# Patient Record
Sex: MALE
[De-identification: names, ages, dates, MRNs, and addresses within clinical notes are randomized per-mention and may not be internally consistent; named-entity substitution may affect disease eponyms.]

---

## 2018-06-08 PROBLEM — Z00.00 ENCOUNTER FOR PREVENTIVE HEALTH EXAMINATION: Status: ACTIVE | Noted: 2018-06-08

## 2018-06-12 ENCOUNTER — APPOINTMENT (OUTPATIENT)
Dept: UROLOGY | Facility: CLINIC | Age: 58
End: 2018-06-12
Payer: COMMERCIAL

## 2018-06-12 VITALS
BODY MASS INDEX: 28.44 KG/M2 | HEIGHT: 72 IN | HEART RATE: 74 BPM | SYSTOLIC BLOOD PRESSURE: 132 MMHG | DIASTOLIC BLOOD PRESSURE: 88 MMHG | WEIGHT: 210 LBS | OXYGEN SATURATION: 94 % | RESPIRATION RATE: 16 BRPM

## 2018-06-12 DIAGNOSIS — Z80.3 FAMILY HISTORY OF MALIGNANT NEOPLASM OF BREAST: ICD-10-CM

## 2018-06-12 DIAGNOSIS — Z80.42 FAMILY HISTORY OF MALIGNANT NEOPLASM OF PROSTATE: ICD-10-CM

## 2018-06-12 PROCEDURE — 99214 OFFICE O/P EST MOD 30 MIN: CPT

## 2018-09-04 ENCOUNTER — APPOINTMENT (OUTPATIENT)
Dept: UROLOGY | Facility: CLINIC | Age: 58
End: 2018-09-04

## 2019-02-15 ENCOUNTER — APPOINTMENT (OUTPATIENT)
Dept: UROLOGY | Facility: CLINIC | Age: 59
End: 2019-02-15
Payer: COMMERCIAL

## 2019-02-15 VITALS
SYSTOLIC BLOOD PRESSURE: 136 MMHG | OXYGEN SATURATION: 97 % | HEART RATE: 71 BPM | RESPIRATION RATE: 17 BRPM | DIASTOLIC BLOOD PRESSURE: 72 MMHG

## 2019-02-15 PROCEDURE — 99214 OFFICE O/P EST MOD 30 MIN: CPT

## 2019-02-15 RX ORDER — CIPROFLOXACIN HYDROCHLORIDE 500 MG/1
500 TABLET, FILM COATED ORAL
Qty: 2 | Refills: 0 | Status: DISCONTINUED | COMMUNITY
Start: 2018-06-12 | End: 2019-02-15

## 2019-02-15 RX ORDER — CIPROFLOXACIN HYDROCHLORIDE 500 MG/1
500 TABLET, FILM COATED ORAL
Qty: 2 | Refills: 0 | Status: ACTIVE | COMMUNITY
Start: 2019-02-15 | End: 1900-01-01

## 2019-02-15 RX ORDER — ENEMA 19; 7 G/133ML; G/133ML
7-19 ENEMA RECTAL
Qty: 1 | Refills: 0 | Status: ACTIVE | COMMUNITY
Start: 2019-02-15 | End: 1900-01-01

## 2019-02-15 NOTE — HISTORY OF PRESENT ILLNESS
[FreeTextEntry1] : Very pleasant 58-year-old gentleman with elevated PSA to 14, last done 2018. Patient had previously seen Dr. Clay and was recommended to undergo a prostate biopsy. He did not show up for these appointments, and did not followup afterwards. PSA trend brook from 2 in . Prior to most recent PSA, PSA was 7.\par \par He denies dysuria. No hematuria. No flank pain or suprapubic pain. He reports a moderate urinary stream.\par \par History of prostate cancer into his brothers, one of whom  of his disease. [None] : no symptoms

## 2019-02-15 NOTE — ASSESSMENT
[FreeTextEntry1] : Very pleasant 58 year-old gentleman who presents for followup of elevated PSA\par -I discussed the need to perform a transrectal ultrasound-guided prostate biopsy with the patient.  I reviewed the potential etiologies of an elevated PSA with the patient, including but not limited to prostate cancer, benign prostatic hyperplasia (BPH), inflammation of the prostate, urinary tract infection (UTI), older age, and sexual intercourse. I discussed the risks of a prostate biopsy with the patient, including but not limited to pain, rectal bleeding, blood in the urine and semen, difficulty or inability to urinate, and infection. We discussed the procedure itself, including the need to place a transrectal ultrasound probe in the rectum and take systematic biopsies of the prostate gland after providing a local anesthetic agent.  I explained the sundar-procedural preparations that the patient will need to take, including self-administration of an enema the day of the biopsy and taking an oral antibiotic the evening before and the day of the biopsy.  An information handout was given to the patient. He wishes to proceed and we will schedule the biopsy for the near future.\par -Urine culture\par -Repeat PSA today\par -Discussed the potential severity of an elevated PSA to 14 as well as a family history of prostate cancer. Patient understands and wishes to proceed

## 2019-02-15 NOTE — PHYSICAL EXAM
[General Appearance - Well Developed] : well developed [General Appearance - Well Nourished] : well nourished [Normal Appearance] : normal appearance [Well Groomed] : well groomed [General Appearance - In No Acute Distress] : no acute distress [Abdomen Soft] : soft [Abdomen Tenderness] : non-tender [Costovertebral Angle Tenderness] : no ~M costovertebral angle tenderness [Urethral Meatus] : meatus normal [Urinary Bladder Findings] : the bladder was normal on palpation [Scrotum] : the scrotum was normal [Testes Mass (___cm)] : there were no testicular masses [FreeTextEntry1] : left firmness on CHARLETTE [Edema] : no peripheral edema [] : no respiratory distress [Respiration, Rhythm And Depth] : normal respiratory rhythm and effort [Exaggerated Use Of Accessory Muscles For Inspiration] : no accessory muscle use [Oriented To Time, Place, And Person] : oriented to person, place, and time [Affect] : the affect was normal [Mood] : the mood was normal [Not Anxious] : not anxious [Normal Station and Gait] : the gait and station were normal for the patient's age [No Focal Deficits] : no focal deficits [No Palpable Adenopathy] : no palpable adenopathy

## 2019-02-19 LAB
BACTERIA UR CULT: NORMAL
PSA FREE FLD-MCNC: 10 %
PSA FREE SERPL-MCNC: 1.21 NG/ML
PSA SERPL-MCNC: 11.88 NG/ML

## 2019-03-04 ENCOUNTER — MESSAGE (OUTPATIENT)
Age: 59
End: 2019-03-04

## 2019-03-05 ENCOUNTER — APPOINTMENT (OUTPATIENT)
Dept: UROLOGY | Facility: CLINIC | Age: 59
End: 2019-03-05
Payer: COMMERCIAL

## 2019-03-05 VITALS
SYSTOLIC BLOOD PRESSURE: 138 MMHG | BODY MASS INDEX: 28.44 KG/M2 | HEART RATE: 76 BPM | HEIGHT: 72 IN | WEIGHT: 210 LBS | DIASTOLIC BLOOD PRESSURE: 72 MMHG

## 2019-03-05 PROCEDURE — 76942 ECHO GUIDE FOR BIOPSY: CPT | Mod: 59

## 2019-03-05 PROCEDURE — 76872 US TRANSRECTAL: CPT

## 2019-03-05 PROCEDURE — 55700: CPT

## 2019-03-20 ENCOUNTER — APPOINTMENT (OUTPATIENT)
Dept: UROLOGY | Facility: CLINIC | Age: 59
End: 2019-03-20
Payer: COMMERCIAL

## 2019-03-20 VITALS
SYSTOLIC BLOOD PRESSURE: 122 MMHG | WEIGHT: 210 LBS | HEIGHT: 72 IN | HEART RATE: 86 BPM | BODY MASS INDEX: 28.44 KG/M2 | DIASTOLIC BLOOD PRESSURE: 83 MMHG | RESPIRATION RATE: 17 BRPM

## 2019-03-20 LAB — CORE LAB BIOPSY: NORMAL

## 2019-03-20 PROCEDURE — 99214 OFFICE O/P EST MOD 30 MIN: CPT

## 2019-03-20 NOTE — PHYSICAL EXAM
[General Appearance - Well Developed] : well developed [General Appearance - Well Nourished] : well nourished [Normal Appearance] : normal appearance [General Appearance - In No Acute Distress] : no acute distress [Well Groomed] : well groomed [Abdomen Soft] : soft [Abdomen Tenderness] : non-tender [Costovertebral Angle Tenderness] : no ~M costovertebral angle tenderness [Urinary Bladder Findings] : the bladder was normal on palpation [Edema] : no peripheral edema [] : no respiratory distress [Respiration, Rhythm And Depth] : normal respiratory rhythm and effort [Exaggerated Use Of Accessory Muscles For Inspiration] : no accessory muscle use [Oriented To Time, Place, And Person] : oriented to person, place, and time [Affect] : the affect was normal [Mood] : the mood was normal [Not Anxious] : not anxious [Normal Station and Gait] : the gait and station were normal for the patient's age [No Focal Deficits] : no focal deficits [No Palpable Adenopathy] : no palpable adenopathy

## 2019-03-20 NOTE — ASSESSMENT
[FreeTextEntry1] : Very pleasant 58 year-old gentleman presents for followup of elevated PSA and newly diagnosed prostate cancer\par -We discussed the results of prostate biopsy in detail\par -We discussed the typical management options for prostate cancer, including the risks and benefits of each\par -CT scan and bone scan to rule out metastatic disease\par -BMP\par -Follow up in 3-4 weeks

## 2019-04-04 ENCOUNTER — APPOINTMENT (OUTPATIENT)
Dept: NUCLEAR MEDICINE | Facility: IMAGING CENTER | Age: 59
End: 2019-04-04
Payer: COMMERCIAL

## 2019-04-04 ENCOUNTER — OUTPATIENT (OUTPATIENT)
Dept: OUTPATIENT SERVICES | Facility: HOSPITAL | Age: 59
LOS: 1 days | End: 2019-04-04
Payer: COMMERCIAL

## 2019-04-04 DIAGNOSIS — C61 MALIGNANT NEOPLASM OF PROSTATE: ICD-10-CM

## 2019-04-04 PROCEDURE — A9561: CPT

## 2019-04-04 PROCEDURE — 78306 BONE IMAGING WHOLE BODY: CPT | Mod: 26

## 2019-04-04 PROCEDURE — 78306 BONE IMAGING WHOLE BODY: CPT

## 2019-04-10 ENCOUNTER — APPOINTMENT (OUTPATIENT)
Dept: UROLOGY | Facility: CLINIC | Age: 59
End: 2019-04-10
Payer: COMMERCIAL

## 2019-04-10 VITALS
HEART RATE: 67 BPM | SYSTOLIC BLOOD PRESSURE: 116 MMHG | DIASTOLIC BLOOD PRESSURE: 69 MMHG | HEIGHT: 72 IN | BODY MASS INDEX: 28.44 KG/M2 | WEIGHT: 210 LBS

## 2019-04-10 PROCEDURE — 99213 OFFICE O/P EST LOW 20 MIN: CPT

## 2019-04-10 NOTE — ASSESSMENT
[FreeTextEntry1] : Very pleasant 58 year-old gentleman presents for followup of prostate cancer\par -Bone scan reviewed\par -Patient needs CT\par -Follow up after CT

## 2019-04-10 NOTE — HISTORY OF PRESENT ILLNESS
[None] : no symptoms [FreeTextEntry1] : Very pleasant 58-year-old gentleman presents for followup of prostate cancer. Patient recently had bone scan that showed no evidence of metastatic disease.  He did not have CT yet.\par \par Biopsy demonstrated [place] group grade 2 prostate cancer at the left apex, left mid, left base, right base, [place] group grade 1 prostate cancer at the right apex, increasing group grade 3 prostate cancer at the right mid.

## 2019-04-19 ENCOUNTER — OUTPATIENT (OUTPATIENT)
Dept: OUTPATIENT SERVICES | Facility: HOSPITAL | Age: 59
LOS: 1 days | End: 2019-04-19
Payer: COMMERCIAL

## 2019-04-19 ENCOUNTER — APPOINTMENT (OUTPATIENT)
Dept: CT IMAGING | Facility: IMAGING CENTER | Age: 59
End: 2019-04-19
Payer: COMMERCIAL

## 2019-04-19 DIAGNOSIS — C61 MALIGNANT NEOPLASM OF PROSTATE: ICD-10-CM

## 2019-04-19 PROCEDURE — 74177 CT ABD & PELVIS W/CONTRAST: CPT | Mod: 26

## 2019-04-19 PROCEDURE — 74177 CT ABD & PELVIS W/CONTRAST: CPT

## 2019-04-24 ENCOUNTER — APPOINTMENT (OUTPATIENT)
Dept: UROLOGY | Facility: CLINIC | Age: 59
End: 2019-04-24
Payer: COMMERCIAL

## 2019-04-24 VITALS
HEART RATE: 72 BPM | DIASTOLIC BLOOD PRESSURE: 79 MMHG | WEIGHT: 210 LBS | SYSTOLIC BLOOD PRESSURE: 120 MMHG | BODY MASS INDEX: 28.48 KG/M2

## 2019-04-24 DIAGNOSIS — C61 MALIGNANT NEOPLASM OF PROSTATE: ICD-10-CM

## 2019-04-24 DIAGNOSIS — N40.1 BENIGN PROSTATIC HYPERPLASIA WITH LOWER URINARY TRACT SYMPMS: ICD-10-CM

## 2019-04-24 DIAGNOSIS — R97.20 ELEVATED PROSTATE, SPECIFIC ANTIGEN [PSA]: ICD-10-CM

## 2019-04-24 DIAGNOSIS — N13.8 BENIGN PROSTATIC HYPERPLASIA WITH LOWER URINARY TRACT SYMPMS: ICD-10-CM

## 2019-04-24 PROCEDURE — 99214 OFFICE O/P EST MOD 30 MIN: CPT

## 2019-04-24 NOTE — HISTORY OF PRESENT ILLNESS
[FreeTextEntry1] : Very pleasant 58-year-old gentleman who presents for followup of prostate cancer. Patient recently had CT scan which demonstrated no evidence of metastatic disease. Bone scan also demonstrated no evidence of metastatic disease. Patient presents today to discuss options for management moving forward. [None] : no symptoms

## 2019-04-24 NOTE — ASSESSMENT
[FreeTextEntry1] : Very pleasant 58-year-old gentleman presents for followup of prostate cancer\par -CT images reviewed\par -We reviewed the results of pathology in detail\par -We discussed different treatment options for prostate cancer, including radiation and surgery. Patient reports that his brother was treated with radiation including brachytherapy seeds and was happy with this treatment\par -I encouraged him to see both a radiation oncologist and a urologic oncologist and have provided references for the patient to see

## 2019-04-24 NOTE — PHYSICAL EXAM
[General Appearance - Well Developed] : well developed [Well Groomed] : well groomed [Normal Appearance] : normal appearance [General Appearance - Well Nourished] : well nourished [Abdomen Soft] : soft [General Appearance - In No Acute Distress] : no acute distress [Abdomen Tenderness] : non-tender [Costovertebral Angle Tenderness] : no ~M costovertebral angle tenderness [Urinary Bladder Findings] : the bladder was normal on palpation [Edema] : no peripheral edema [] : no respiratory distress [Respiration, Rhythm And Depth] : normal respiratory rhythm and effort [Exaggerated Use Of Accessory Muscles For Inspiration] : no accessory muscle use [Oriented To Time, Place, And Person] : oriented to person, place, and time [Affect] : the affect was normal [Mood] : the mood was normal [Not Anxious] : not anxious [Normal Station and Gait] : the gait and station were normal for the patient's age [No Focal Deficits] : no focal deficits [No Palpable Adenopathy] : no palpable adenopathy

## 2019-06-07 ENCOUNTER — APPOINTMENT (OUTPATIENT)
Dept: RADIATION ONCOLOGY | Facility: CLINIC | Age: 59
End: 2019-06-07
Payer: COMMERCIAL

## 2019-06-07 ENCOUNTER — OUTPATIENT (OUTPATIENT)
Dept: OUTPATIENT SERVICES | Facility: HOSPITAL | Age: 59
LOS: 1 days | Discharge: HOME | End: 2019-06-07
Payer: COMMERCIAL

## 2019-06-12 ENCOUNTER — APPOINTMENT (OUTPATIENT)
Dept: RADIATION ONCOLOGY | Facility: CLINIC | Age: 59
End: 2019-06-12
Payer: COMMERCIAL

## 2019-06-12 ENCOUNTER — MESSAGE (OUTPATIENT)
Age: 59
End: 2019-06-12

## 2019-06-12 VITALS — RESPIRATION RATE: 16 BRPM | WEIGHT: 210 LBS | BODY MASS INDEX: 28.44 KG/M2 | HEIGHT: 72 IN

## 2019-06-12 PROCEDURE — 77263 THER RADIOLOGY TX PLNG CPLX: CPT

## 2019-06-12 PROCEDURE — 99205 OFFICE O/P NEW HI 60 MIN: CPT | Mod: 25

## 2019-06-12 NOTE — REVIEW OF SYSTEMS
[IPSS Score (0-40): ___] : IPSS score: [unfilled] [EPIC-CP Score (0-60): ___] : EPIC-CP score: [unfilled] [Negative] : Allergic/Immunologic [Anal Pain: Grade 0] : Anal Pain: Grade 0 [Diarrhea: Grade 0] : Diarrhea: Grade 0 [Constipation: Grade 0] : Constipation: Grade 0 [Dyspepsia: Grade 0] : Dyspepsia: Grade 0 [Dysphagia: Grade 0] : Dysphagia: Grade 0 [Esophagitis: Grade 0] : Esophagitis: Grade 0 [Gastroparesis: Grade 0] : Gastroparesis: Grade 0 [Fecal Incontinence: Grade 0] : Fecal Incontinence: Grade 0 [Nausea: Grade 0] : Nausea: Grade 0 [Proctitis: Grade 0] : Proctitis: Grade 0 [Vomiting: Grade 0] : Vomiting: Grade 0 [Small Intestinal Obstruction: Grade 0] : Small Intestinal Obstruction: Grade 0 [Rectal Pain: Grade 0] : Rectal Pain: Grade 0 [Hematuria: Grade 0] : Hematuria: Grade 0 [Urinary Incontinence: Grade 0] : Urinary Incontinence: Grade 0  [Urinary Retention: Grade 0] : Urinary Retention: Grade 0 [Urinary Tract Pain: Grade 0] : Urinary Tract Pain: Grade 0 [Urinary Frequency: Grade 0] : Urinary Frequency: Grade 0 [Urinary Urgency: Grade 0] : Urinary Urgency: Grade 0 [Erectile Dysfunction: Grade 0] : Erectile Dysfunction: Grade 0 [Ejaculation Disorder: Grade 0] : Ejaculation Disorder: Grade 0

## 2019-06-12 NOTE — VITALS
[Least Pain Intensity: 0/10] : 0/10 [Maximal Pain Intensity: 0/10] : 0/10 [ECOG Performance Status: 1 - Restricted in physically strenuous activity but ambulatory and able to carry out work of a light or sedentary nature] : Performance Status: 1 - Restricted in physically strenuous activity but ambulatory and able to carry out work of a light or sedentary nature, e.g., light house work, office work [90: Able to carry normal activity; minor signs or symptoms of disease.] : 90: Able to carry normal activity; minor signs or symptoms of disease.

## 2019-06-14 NOTE — DISEASE MANAGEMENT
[] : Patient had a bone scan [7(4+3)] : Kandice Score 7(4+3) [BiopsyDate] : 03/19 [MeasuredProstateVolume] : 37 [TotalCores] : 12 [TotalPositiveCores] : 12 [MaxCoreInvolvement] : 80 [II] : II [CTresults] : negative [BoneScanResults] : negative

## 2019-06-14 NOTE — HISTORY OF PRESENT ILLNESS
[FreeTextEntry1] : 57 y/o male with newly diagnosed prostate cancer presents for consideration of radiation treatment.\par \par Prostate biopsy in 3/2019: terri 4+3 in 2 cores, [place] 3+4 and 3+3, 12/12 cores positive. PSA 11.88 in 2/2019. Prostate vol 37cc from biopsy note. \par \par Bone scan and CT scan on 4/2019 showed : no mets. \par \par Radiation treatment side effects reviewed with Pt which include but not limited to urinary and bowel movement problems, erectile dysfunction, necrosis, bleeding.

## 2019-06-14 NOTE — PHYSICAL EXAM
[Normal] : normal external genitalia without lesions and no testicular masses [Normal] : oriented to person, place and time, the affect was normal, the mood was normal and not anxious

## 2019-08-14 RX ORDER — BICALUTAMIDE 50 MG/1
50 TABLET ORAL
Qty: 30 | Refills: 1 | Status: ACTIVE | COMMUNITY
Start: 2019-08-14 | End: 1900-01-01

## 2019-08-30 RX ORDER — LEUPROLIDE ACETATE 22.5 MG
22.5 KIT INTRAMUSCULAR
Qty: 1 | Refills: 1 | Status: ACTIVE | COMMUNITY
Start: 2019-08-30 | End: 1900-01-01

## 2020-01-17 ENCOUNTER — OUTPATIENT (OUTPATIENT)
Dept: OUTPATIENT SERVICES | Facility: HOSPITAL | Age: 60
LOS: 1 days | Discharge: HOME | End: 2020-01-17
Payer: COMMERCIAL

## 2020-01-28 NOTE — PROCEDURE
[FreeTextEntry3] : Pt brought in lupron 22.5mg kit for IM injection. It was given today to right buttock. Pt tolerated well. Lot # [id number], Exp date 6/8/2022

## 2020-02-07 ENCOUNTER — APPOINTMENT (OUTPATIENT)
Dept: MRI IMAGING | Facility: IMAGING CENTER | Age: 60
End: 2020-02-07

## 2020-02-14 PROCEDURE — 09050: CPT

## 2020-02-14 PROCEDURE — 77333 RADIATION TREATMENT AID(S): CPT | Mod: 26

## 2020-02-21 ENCOUNTER — APPOINTMENT (OUTPATIENT)
Dept: MRI IMAGING | Facility: CLINIC | Age: 60
End: 2020-02-21

## 2020-02-26 PROCEDURE — 77300 RADIATION THERAPY DOSE PLAN: CPT | Mod: 26

## 2020-02-26 PROCEDURE — 77338 DESIGN MLC DEVICE FOR IMRT: CPT | Mod: 26

## 2020-02-26 PROCEDURE — 77301 RADIOTHERAPY DOSE PLAN IMRT: CPT | Mod: 26

## 2020-03-08 ENCOUNTER — APPOINTMENT (OUTPATIENT)
Dept: MRI IMAGING | Facility: IMAGING CENTER | Age: 60
End: 2020-03-08

## 2020-03-09 PROCEDURE — 77014: CPT | Mod: 26

## 2020-03-10 PROCEDURE — 77014: CPT | Mod: 26

## 2020-03-11 PROCEDURE — 77014: CPT | Mod: 26

## 2020-03-12 PROCEDURE — 77014: CPT | Mod: 26

## 2020-03-13 PROCEDURE — 77427 RADIATION TX MANAGEMENT X5: CPT

## 2020-03-13 PROCEDURE — 77014: CPT | Mod: 26

## 2020-03-16 PROCEDURE — 77014: CPT | Mod: 26

## 2020-03-17 PROCEDURE — 77014: CPT | Mod: 26

## 2020-03-18 PROCEDURE — 77014: CPT | Mod: 26

## 2020-03-19 PROCEDURE — 77014: CPT | Mod: 26

## 2020-03-20 PROCEDURE — 77014: CPT | Mod: 26

## 2020-03-20 PROCEDURE — 77427 RADIATION TX MANAGEMENT X5: CPT

## 2020-03-22 ENCOUNTER — APPOINTMENT (OUTPATIENT)
Dept: MRI IMAGING | Facility: IMAGING CENTER | Age: 60
End: 2020-03-22

## 2020-03-23 PROCEDURE — 77014: CPT | Mod: 26

## 2020-03-24 PROCEDURE — 77014: CPT | Mod: 26

## 2020-03-25 PROCEDURE — 77014: CPT | Mod: 26

## 2020-03-26 PROCEDURE — 77014: CPT | Mod: 26

## 2020-03-27 PROCEDURE — 77014: CPT | Mod: 26

## 2020-03-27 PROCEDURE — 77427 RADIATION TX MANAGEMENT X5: CPT

## 2020-03-30 PROCEDURE — 77014: CPT | Mod: 26

## 2020-03-31 PROCEDURE — 77014: CPT | Mod: 26

## 2020-04-01 PROCEDURE — 77014: CPT | Mod: 26

## 2020-04-02 PROCEDURE — 77014: CPT | Mod: 26

## 2020-04-03 PROCEDURE — 77014: CPT | Mod: 26

## 2020-04-03 PROCEDURE — 77427 RADIATION TX MANAGEMENT X5: CPT

## 2020-04-06 PROCEDURE — 77014: CPT | Mod: 26

## 2020-04-08 PROCEDURE — 77014: CPT | Mod: 26

## 2020-04-08 NOTE — HISTORY OF PRESENT ILLNESS
[FreeTextEntry1] : 59 y/o male with newly diagnosed prostate cancer presents for radiation treatment.\par \par Prostate biopsy in 3/2019: terri 4+3 in 2 cores, [place] 3+4 and 3+3, 12/12 cores positive. PSA 11.88 in 2/2019. Prostate vol 37cc from biopsy note. \par \par Bone scan and CT scan on 4/2019 showed : no mets. \par \par 4/28 fractions of RT today. No urinary or bowel movement changes from baseline. c/o painful erection, advised to see urologist. the 2nd lupron shot 22.5mg in 1/2020.

## 2020-04-08 NOTE — REVIEW OF SYSTEMS
[Anal Pain: Grade 0] : Anal Pain: Grade 0 [Constipation: Grade 0] : Constipation: Grade 0 [Diarrhea: Grade 0] : Diarrhea: Grade 0 [Dyspepsia: Grade 0] : Dyspepsia: Grade 0 [Dysphagia: Grade 0] : Dysphagia: Grade 0 [Esophagitis: Grade 0] : Esophagitis: Grade 0 [Fecal Incontinence: Grade 0] : Fecal Incontinence: Grade 0 [Gastroparesis: Grade 0] : Gastroparesis: Grade 0 [Nausea: Grade 0] : Nausea: Grade 0 [Proctitis: Grade 0] : Proctitis: Grade 0 [Rectal Pain: Grade 0] : Rectal Pain: Grade 0 [Small Intestinal Obstruction: Grade 0] : Small Intestinal Obstruction: Grade 0 [Vomiting: Grade 0] : Vomiting: Grade 0 [Hematuria: Grade 0] : Hematuria: Grade 0 [Urinary Incontinence: Grade 0] : Urinary Incontinence: Grade 0  [Urinary Retention: Grade 0] : Urinary Retention: Grade 0 [Urinary Tract Pain: Grade 0] : Urinary Tract Pain: Grade 0 [Urinary Urgency: Grade 0] : Urinary Urgency: Grade 0 [Urinary Frequency: Grade 0] : Urinary Frequency: Grade 0 [Ejaculation Disorder: Grade 1 - Diminished ejaculation] : Ejaculation Disorder: Grade 1 - Diminished ejaculation  [Erectile Dysfunction: Grade 1 - Decrease in erectile function (frequency or rigidity of erections) but intervention not indicated (e.g., medication or use of mechanical device, penile pump)] : Erectile Dysfunction: Grade 1 - Decrease in erectile function (frequency or rigidity of erections) but intervention not indicated (e.g., medication or use of mechanical device, penile pump) [FreeTextEntry8] : painful erection

## 2020-04-08 NOTE — DISEASE MANAGEMENT
[Clinical] : TNM Stage: c [II] : II [TTNM] : 1c [NTNM] : x [MTNM] : x [de-identified] : 70 Gy [de-identified] : prostate

## 2020-04-08 NOTE — HISTORY OF PRESENT ILLNESS
[FreeTextEntry1] : 59 y/o male with newly diagnosed prostate cancer presents for radiation treatment.\par \par Prostate biopsy in 3/2019: terri 4+3 in 2 cores, [place] 3+4 and 3+3, 12/12 cores positive. PSA 11.88 in 2/2019. Prostate vol 37cc from biopsy note. \par \par Bone scan and CT scan on 4/2019 showed : no mets. \par \par 15/28 fractions of RT today. No urinary or bowel movement changes from baseline. c/o painful erection, advised to see urologist. the 2nd lupron shot 22.5mg in 1/2020.

## 2020-04-08 NOTE — DISEASE MANAGEMENT
[Clinical] : TNM Stage: c [II] : II [TTNM] : 1c [NTNM] : x [MTNM] : x [de-identified] : 70 Gy [de-identified] : prostate

## 2020-04-09 PROCEDURE — 77014: CPT | Mod: 26

## 2020-04-10 PROCEDURE — 77014: CPT | Mod: 26

## 2020-04-13 PROCEDURE — 77014: CPT | Mod: 26

## 2020-04-13 PROCEDURE — 77427 RADIATION TX MANAGEMENT X5: CPT

## 2020-04-14 PROCEDURE — 77014: CPT | Mod: 26

## 2020-04-15 PROCEDURE — 77014: CPT | Mod: 26

## 2020-04-16 PROCEDURE — 77014: CPT | Mod: 26

## 2020-04-22 NOTE — DISEASE MANAGEMENT
[Clinical] : TNM Stage: c [II] : II [TTNM] : 1c [NTNM] : x [de-identified] : prostate [de-identified] : 70 Gy [MTNM] : x

## 2020-04-22 NOTE — HISTORY OF PRESENT ILLNESS
[FreeTextEntry1] : 59 y/o male with newly diagnosed prostate cancer presents for radiation treatment.\par \par Prostate biopsy in 3/2019: terri 4+3 in 2 cores, [place] 3+4 and 3+3, 12/12 cores positive. PSA 11.88 in 2/2019. Prostate vol 37cc from biopsy note. \par \par Bone scan and CT scan on 4/2019 showed : no mets. \par \par 19/28 fractions of RT today. No urinary or bowel movement changes from baseline. c/o painful erection, advised to see urologist. the 2nd lupron shot 22.5mg in 1/2020.

## 2020-05-03 NOTE — DISEASE MANAGEMENT
[Clinical] : TNM Stage: c [II] : II [TTNM] : 1c [NTNM] : x [MTNM] : x [de-identified] : 70 Gy [de-identified] : prostate

## 2020-05-03 NOTE — REVIEW OF SYSTEMS
[Anal Pain: Grade 0] : Anal Pain: Grade 0 [Constipation: Grade 0] : Constipation: Grade 0 [Diarrhea: Grade 0] : Diarrhea: Grade 0 [Dyspepsia: Grade 0] : Dyspepsia: Grade 0 [Dysphagia: Grade 0] : Dysphagia: Grade 0 [Esophagitis: Grade 0] : Esophagitis: Grade 0 [Fecal Incontinence: Grade 0] : Fecal Incontinence: Grade 0 [Gastroparesis: Grade 0] : Gastroparesis: Grade 0 [Nausea: Grade 0] : Nausea: Grade 0 [Proctitis: Grade 0] : Proctitis: Grade 0 [Rectal Pain: Grade 0] : Rectal Pain: Grade 0 [Small Intestinal Obstruction: Grade 0] : Small Intestinal Obstruction: Grade 0 [Vomiting: Grade 0] : Vomiting: Grade 0 [Hematuria: Grade 0] : Hematuria: Grade 0 [Urinary Incontinence: Grade 0] : Urinary Incontinence: Grade 0  [Urinary Retention: Grade 1 - Urinary, suprapubic or intermittent catheter placement not indicated; able to void with some residual] : Urinary Retention: Grade 1 - Urinary, suprapubic or intermittent catheter placement not indicated; able to void with some residual [Urinary Tract Pain: Grade 0] : Urinary Tract Pain: Grade 0 [Urinary Urgency: Grade 0] : Urinary Urgency: Grade 0 [Urinary Frequency: Grade 1 - Present] : Urinary Frequency: Grade 1 - Present [Ejaculation Disorder: Grade 1 - Diminished ejaculation] : Ejaculation Disorder: Grade 1 - Diminished ejaculation  [Erectile Dysfunction: Grade 1 - Decrease in erectile function (frequency or rigidity of erections) but intervention not indicated (e.g., medication or use of mechanical device, penile pump)] : Erectile Dysfunction: Grade 1 - Decrease in erectile function (frequency or rigidity of erections) but intervention not indicated (e.g., medication or use of mechanical device, penile pump) [FreeTextEntry8] : painful erection

## 2020-05-03 NOTE — DISEASE MANAGEMENT
[Clinical] : TNM Stage: c [II] : II [TTNM] : 1c [NTNM] : x [MTNM] : x [de-identified] : 70 Gy [de-identified] : prostate

## 2020-05-03 NOTE — HISTORY OF PRESENT ILLNESS
[FreeTextEntry1] : 59 y/o male with newly diagnosed prostate cancer presents for radiation treatment.\par \par Prostate biopsy in 3/2019: terri 4+3 in 2 cores, [place] 3+4 and 3+3, 12/12 cores positive. PSA 11.88 in 2/2019. Prostate vol 37cc from biopsy note. \par \par Bone scan and CT scan on 4/2019 showed : no mets. \par \par 28/28 fractions of RT today. c/o urinary frequency and weak urinary flow, but does not want to try any meds. No bowel movement changes from baseline. c/o painful erection, advised to see urologist. the 2nd lupron shot 22.5mg in 1/2020.

## 2020-05-03 NOTE — HISTORY OF PRESENT ILLNESS
[FreeTextEntry1] : 57 y/o male with newly diagnosed prostate cancer presents for radiation treatment.\par \par Prostate biopsy in 3/2019: terri 4+3 in 2 cores, [place] 3+4 and 3+3, 12/12 cores positive. PSA 11.88 in 2/2019. Prostate vol 37cc from biopsy note. \par \par Bone scan and CT scan on 4/2019 showed : no mets. \par \par 9/28 fractions of RT today. No urinary or bowel movement changes from baseline. c/o painful erection, advised to see urologist. the 2nd lupron shot 22.5mg in 1/2020.

## 2020-05-03 NOTE — DISEASE MANAGEMENT
[Clinical] : TNM Stage: c [II] : II [TTNM] : 1c [NTNM] : x [MTNM] : x [de-identified] : prostate [de-identified] : 70 Gy

## 2020-05-03 NOTE — HISTORY OF PRESENT ILLNESS
[FreeTextEntry1] : 57 y/o male with newly diagnosed prostate cancer presents for radiation treatment.\par \par Prostate biopsy in 3/2019: terri 4+3 in 2 cores, [place] 3+4 and 3+3, 12/12 cores positive. PSA 11.88 in 2/2019. Prostate vol 37cc from biopsy note. \par \par Bone scan and CT scan on 4/2019 showed : no mets. \par \par 23/28 fractions of RT today. c/o urinary frequency and weak urinary flow, but does not want to try any meds. No bowel movement changes from baseline. c/o painful erection, advised to see urologist. the 2nd lupron shot 22.5mg in 1/2020.

## 2020-06-12 ENCOUNTER — APPOINTMENT (OUTPATIENT)
Dept: RADIATION ONCOLOGY | Facility: CLINIC | Age: 60
End: 2020-06-12
Payer: COMMERCIAL

## 2020-06-12 PROCEDURE — 99024 POSTOP FOLLOW-UP VISIT: CPT

## 2020-06-12 NOTE — REASON FOR VISIT
[Home] : at home, [unfilled] , at the time of the visit. [Medical Office: ([facility])___] : at the medical office located in  [Verbal consent obtained from patient] : the patient, [unfilled] [Post-Treatment Evaluation] : post-treatment evaluation for [Prostate Cancer] : prostate cancer

## 2020-06-12 NOTE — VITALS
[Least Pain Intensity: 0/10] : 0/10 [80: Normal activity with effort; some signs or symptoms of disease.] : 80: Normal activity with effort; some signs or symptoms of disease.  [Maximal Pain Intensity: 0/10] : 0/10 [ECOG Performance Status: 1 - Restricted in physically strenuous activity but ambulatory and able to carry out work of a light or sedentary nature] : Performance Status: 1 - Restricted in physically strenuous activity but ambulatory and able to carry out work of a light or sedentary nature, e.g., light house work, office work

## 2020-06-15 NOTE — REVIEW OF SYSTEMS
[Negative] : Allergic/Immunologic [Anal Pain: Grade 0] : Anal Pain: Grade 0 [Constipation: Grade 0] : Constipation: Grade 0 [Diarrhea: Grade 0] : Diarrhea: Grade 0 [Dyspepsia: Grade 0] : Dyspepsia: Grade 0 [Dysphagia: Grade 0] : Dysphagia: Grade 0 [Esophagitis: Grade 0] : Esophagitis: Grade 0 [Fecal Incontinence: Grade 0] : Fecal Incontinence: Grade 0 [Gastroparesis: Grade 0] : Gastroparesis: Grade 0 [Nausea: Grade 0] : Nausea: Grade 0 [Proctitis: Grade 0] : Proctitis: Grade 0 [Rectal Pain: Grade 0] : Rectal Pain: Grade 0 [Small Intestinal Obstruction: Grade 0] : Small Intestinal Obstruction: Grade 0 [Vomiting: Grade 0] : Vomiting: Grade 0 [Hematuria: Grade 0] : Hematuria: Grade 0 [Urinary Incontinence: Grade 0] : Urinary Incontinence: Grade 0  [Urinary Retention: Grade 0] : Urinary Retention: Grade 0 [Urinary Tract Pain: Grade 0] : Urinary Tract Pain: Grade 0 [Urinary Urgency: Grade 0] : Urinary Urgency: Grade 0 [Urinary Frequency: Grade 0] : Urinary Frequency: Grade 0 [Ejaculation Disorder: Grade 1 - Diminished ejaculation] : Ejaculation Disorder: Grade 1 - Diminished ejaculation  [Erectile Dysfunction: Grade 2 - Decrease in erectile function (frequency/rigidity of erections), erectile intervention indicated, (e.g., medication or mechanical devices such as penile pump)] : Erectile Dysfunction: Grade 2 - Decrease in erectile function (frequency/rigidity of erections), erectile intervention indicated, (e.g., medication or mechanical devices such as penile pump) [FreeTextEntry8] : h/o radiation treatment for prostate cancer

## 2020-06-15 NOTE — HISTORY OF PRESENT ILLNESS
[FreeTextEntry1] : Phone call f/u for 20 min due to COVID 19 crisis\par \par 57 y/o male with stage II prostate cancer completed 70 Gy of radiation in 4/2020.\par \par Prostate biopsy in 3/2019: terri 4+3 in 2 cores, [place] 3+4 and 3+3, 12/12 cores positive. PSA 11.88 in 2/2019. Prostate vol 37cc from biopsy note. \par \par Bone scan and CT scan on 4/2019 showed : no mets. \par \par Pt was on ADT for 6 months. Last lupron shot 22.5mg in 1/2020. RTC in 3 months \par \par \par

## 2020-09-10 NOTE — HISTORY OF PRESENT ILLNESS
[FreeTextEntry1] : Very pleasant 58-year-old gentleman presents for followup of elevated PSA and newly diagnosed prostate cancer. The patient underwent prostate biopsy approximately 2 weeks ago. He reports no problems after the biopsy. He reports hematuria for a day or so and hematospermia. Biopsy demonstrated [place] group grade 2 prostate cancer at the left apex, left mid, left base, right base, [place] group grade 1 prostate cancer at the right apex, increasing group grade 3 prostate cancer at the right mid. He presents today for a discussion of these results as well as management options moving forward.  [None] : no symptoms [Acute] : an acute visit [FreeTextEntry1] : possible GCA

## 2020-10-21 RX ORDER — SILDENAFIL 100 MG/1
100 TABLET, FILM COATED ORAL
Qty: 5 | Refills: 3 | Status: ACTIVE | COMMUNITY
Start: 2020-10-21 | End: 1900-01-01

## 2020-10-22 ENCOUNTER — NON-APPOINTMENT (OUTPATIENT)
Age: 60
End: 2020-10-22

## 2020-10-27 ENCOUNTER — APPOINTMENT (OUTPATIENT)
Dept: RADIATION ONCOLOGY | Facility: CLINIC | Age: 60
End: 2020-10-27
Payer: COMMERCIAL

## 2020-11-05 ENCOUNTER — APPOINTMENT (OUTPATIENT)
Dept: RADIATION ONCOLOGY | Facility: CLINIC | Age: 60
End: 2020-11-05
Payer: COMMERCIAL

## 2020-11-05 PROCEDURE — 99443: CPT

## 2020-11-05 RX ORDER — 70%ISOPROPYL ALCOHOL 0.7 ML/ML
70 SWAB TOPICAL
Refills: 0 | Status: ACTIVE | COMMUNITY

## 2020-11-05 NOTE — REASON FOR VISIT
[Home] : at home, [unfilled] , at the time of the visit. [Medical Office: ([facility])___] : at the medical office located in  [Verbal consent obtained from patient] : the patient, [unfilled]

## 2020-11-08 NOTE — HISTORY OF PRESENT ILLNESS
[FreeTextEntry1] : 59 y/o male with stage II prostate cancer completed 70 Gy of radiation in 4/2020.\par \par Prostate biopsy in 3/2019: terri 4+3 in 2 cores, [place] 3+4 and 3+3, 12/12 cores positive. PSA 11.88 in 2/2019. Prostate vol 37cc from biopsy note. \par \par Bone scan and CT scan on 4/2019 showed : no mets. \par \par Pt was on ADT for 6 months. Last lupron shot 22.5mg in 1/2020.\par \par Interval 11/5/20:\par Feeling well, nocturia x 1. No PSA since Feb 2019.

## 2020-11-16 LAB — PSA SERPL-MCNC: 0.52 NG/ML
